# Patient Record
Sex: FEMALE | Race: WHITE | Employment: UNEMPLOYED | URBAN - METROPOLITAN AREA
[De-identification: names, ages, dates, MRNs, and addresses within clinical notes are randomized per-mention and may not be internally consistent; named-entity substitution may affect disease eponyms.]

---

## 2016-08-01 LAB
ANTIBODY SCREEN, EXTERNAL: NORMAL
CHLAMYDIA, EXTERNAL: NEGATIVE
HBSAG, EXTERNAL: NEGATIVE
HIV, EXTERNAL: NEGATIVE
N. GONORRHEA, EXTERNAL: NEGATIVE
RPR, EXTERNAL: NONREACTIVE
RUBELLA, EXTERNAL: NORMAL
TYPE, ABO & RH, EXTERNAL: NORMAL

## 2017-03-01 LAB — GRBS, EXTERNAL: NEGATIVE

## 2017-03-05 ENCOUNTER — HOSPITAL ENCOUNTER (EMERGENCY)
Age: 32
LOS: 1 days | Discharge: HOME | End: 2017-03-05
Attending: OBSTETRICS & GYNECOLOGY | Admitting: OBSTETRICS & GYNECOLOGY
Payer: OTHER GOVERNMENT

## 2017-03-05 VITALS
HEART RATE: 98 BPM | DIASTOLIC BLOOD PRESSURE: 85 MMHG | WEIGHT: 165 LBS | TEMPERATURE: 98.2 F | BODY MASS INDEX: 31.15 KG/M2 | SYSTOLIC BLOOD PRESSURE: 136 MMHG | HEIGHT: 61 IN

## 2017-03-05 LAB
ALBUMIN SERPL BCP-MCNC: 2.5 G/DL (ref 3.4–5)
ALBUMIN/GLOB SERPL: 0.6 {RATIO} (ref 0.8–1.7)
ALP SERPL-CCNC: 154 U/L (ref 45–117)
ALT SERPL-CCNC: 17 U/L (ref 13–56)
ANION GAP BLD CALC-SCNC: 9 MMOL/L (ref 3–18)
APPEARANCE UR: CLEAR
AST SERPL W P-5'-P-CCNC: 17 U/L (ref 15–37)
BILIRUB SERPL-MCNC: 0.2 MG/DL (ref 0.2–1)
BILIRUB UR QL: NEGATIVE
BUN SERPL-MCNC: 4 MG/DL (ref 7–18)
BUN/CREAT SERPL: 8 (ref 12–20)
CALCIUM SERPL-MCNC: 8.6 MG/DL (ref 8.5–10.1)
CHLORIDE SERPL-SCNC: 103 MMOL/L (ref 100–108)
CO2 SERPL-SCNC: 24 MMOL/L (ref 21–32)
COLOR UR: YELLOW
CREAT SERPL-MCNC: 0.51 MG/DL (ref 0.6–1.3)
ERYTHROCYTE [DISTWIDTH] IN BLOOD BY AUTOMATED COUNT: 14 % (ref 11.6–14.5)
GLOBULIN SER CALC-MCNC: 4.1 G/DL (ref 2–4)
GLUCOSE SERPL-MCNC: 77 MG/DL (ref 74–99)
GLUCOSE UR QL STRIP.AUTO: NEGATIVE MG/DL
HCT VFR BLD AUTO: 33.2 % (ref 35–45)
HGB BLD-MCNC: 11.2 G/DL (ref 12–16)
KETONES UR-MCNC: NEGATIVE MG/DL
LDH SERPL L TO P-CCNC: 185 U/L (ref 81–234)
LEUKOCYTE ESTERASE UR QL STRIP: NEGATIVE
MCH RBC QN AUTO: 29.7 PG (ref 24–34)
MCHC RBC AUTO-ENTMCNC: 33.7 G/DL (ref 31–37)
MCV RBC AUTO: 88.1 FL (ref 74–97)
NITRITE UR QL: NEGATIVE
PH UR: 6 [PH] (ref 5–8)
PLATELET # BLD AUTO: 217 K/UL (ref 135–420)
PMV BLD AUTO: 11 FL (ref 9.2–11.8)
POTASSIUM SERPL-SCNC: 3.7 MMOL/L (ref 3.5–5.5)
PROT SERPL-MCNC: 6.6 G/DL (ref 6.4–8.2)
PROT UR QL: NEGATIVE MG/DL
RBC # BLD AUTO: 3.77 M/UL (ref 4.2–5.3)
RBC # UR STRIP: NEGATIVE /UL
SODIUM SERPL-SCNC: 136 MMOL/L (ref 136–145)
SP GR UR: 1.01 (ref 1–1.03)
URATE SERPL-MCNC: 4.7 MG/DL (ref 2.6–7.2)
UROBILINOGEN UR QL: 0.2 EU/DL (ref 0.2–1)
WBC # BLD AUTO: 11.1 K/UL (ref 4.6–13.2)

## 2017-03-05 PROCEDURE — 36415 COLL VENOUS BLD VENIPUNCTURE: CPT | Performed by: ADVANCED PRACTICE MIDWIFE

## 2017-03-05 PROCEDURE — 81003 URINALYSIS AUTO W/O SCOPE: CPT

## 2017-03-05 PROCEDURE — 99284 EMERGENCY DEPT VISIT MOD MDM: CPT

## 2017-03-05 PROCEDURE — 83615 LACTATE (LD) (LDH) ENZYME: CPT | Performed by: ADVANCED PRACTICE MIDWIFE

## 2017-03-05 PROCEDURE — 80053 COMPREHEN METABOLIC PANEL: CPT | Performed by: ADVANCED PRACTICE MIDWIFE

## 2017-03-05 PROCEDURE — 59025 FETAL NON-STRESS TEST: CPT

## 2017-03-05 PROCEDURE — 85027 COMPLETE CBC AUTOMATED: CPT | Performed by: ADVANCED PRACTICE MIDWIFE

## 2017-03-05 PROCEDURE — 84550 ASSAY OF BLOOD/URIC ACID: CPT | Performed by: ADVANCED PRACTICE MIDWIFE

## 2017-03-05 NOTE — DISCHARGE INSTRUCTIONS
Week 37 of Your Pregnancy: Care Instructions  Your Care Instructions    You are near the end of your pregnancy--and you're probably pretty uncomfortable. It may be harder to walk around. Lying down probably isn't comfortable either. You may have trouble getting to sleep or staying asleep. Most women deliver their babies between 40 and 41 weeks. This is a good time to think about packing a bag for the hospital with items you'll need. Then you'll be ready when labor starts. Follow-up care is a key part of your treatment and safety. Be sure to make and go to all appointments, and call your doctor if you are having problems. It's also a good idea to know your test results and keep a list of the medicines you take. How can you care for yourself at home? Learn about breastfeeding  · Breastfeeding is best for your baby and good for you. · Breast milk has antibodies to help your baby fight infections. · Mothers who breastfeed often lose weight faster, because making milk burns calories. · Learning the best ways to hold your baby will make breastfeeding easier. · Let your partner bathe and diaper the baby to keep your partner from feeling left out. Snuggle together when you breastfeed. · You may want to learn how to use a breast pump and store your milk. · If you choose to bottle feed, make the feeding feel like breastfeeding so you can bond with your baby. Always hold your baby and the bottle. Do not prop bottles or let your baby fall asleep with a bottle. Learn about crying  · It is common for babies to cry for 1 to 3 hours a day. Some cry more, some cry less. · Babies don't cry to make you upset or because you are a bad parent. · Crying is how your baby communicates. Your baby may be hungry; have gas; need a diaper change; or feel cold, warm, tired, lonely, or tense. Sometimes babies cry for unknown reasons. · If you respond to your baby's needs, he or she will learn to trust you.   · Try to stay calm when your baby cries. Your baby may get more upset if he or she senses that you are upset. Know how to care for your   · Your baby's umbilical cord stump will drop off on its own, usually between 1 and 2 weeks. To care for your baby's umbilical cord area:  ¨ Clean the area at the bottom of the cord 2 or 3 times a day. ¨ Pay special attention to the area where the cord attaches to the skin. ¨ Keep the diaper folded below the cord. ¨ Use a damp washcloth or cotton ball to sponge bathe your baby until the stump has come off. · Your baby's first dark stool is called meconium. After the meconium is passed, your baby will develop his or her own bowel pattern. ¨ Some babies, especially  babies, have several bowel movements a day. Others have one or two a day, or one every 2 to 3 days. ¨  babies often have loose, yellow stools. Formula-fed babies have more formed stools. ¨ If your baby's stools look like little pellets, he or she is constipated. After 2 days of constipation, call your baby's doctor. · If your baby will be circumcised, you can care for him at home. ¨ Gently rinse his penis with warm water after every diaper change. Do not try to remove the film that forms on the penis. This film will go away on its own. Pat dry. ¨ Put petroleum ointment, such as Vaseline, on the area of the diaper that will touch your baby's penis. This will keep the diaper from sticking to your baby. ¨ Ask the doctor about giving your baby acetaminophen (Tylenol) for pain. Where can you learn more? Go to http://krystal-jenn.info/. Enter 68 21 97 in the search box to learn more about \"Week 37 of Your Pregnancy: Care Instructions. \"  Current as of: May 30, 2016  Content Version: 11.1  © 6877-0945 [employer]. Care instructions adapted under license by [employer] (which disclaims liability or warranty for this information).  If you have questions about a medical condition or this instruction, always ask your healthcare professional. [address] any warranty or liability for your use of this information. Pregnancy Precautions: Care Instructions  Your Care Instructions  There is no sure way to prevent labor before your due date ( labor) or to prevent most other pregnancy problems. But there are things you can do to increase your chances of a healthy pregnancy. Go to your appointments, follow your doctor's advice, and take good care of yourself. Eat well, and exercise (if your doctor agrees). And make sure to drink plenty of water. Follow-up care is a key part of your treatment and safety. Be sure to make and go to all appointments, and call your doctor if you are having problems. It's also a good idea to know your test results and keep a list of the medicines you take. How can you care for yourself at home? · Make sure you go to your prenatal appointments. At each visit, your doctor will check your blood pressure. Your doctor will also check to see if you have protein in your urine. High blood pressure and protein in urine are signs of preeclampsia. This condition can be dangerous for you and your baby. · Drink plenty of fluids, enough so that your urine is light yellow or clear like water. Dehydration can cause contractions. If you have kidney, heart, or liver disease and have to limit fluids, talk with your doctor before you increase the amount of fluids you drink. · Tell your doctor right away if you notice any symptoms of an infection, such as:  ¨ Burning when you urinate. ¨ A foul-smelling discharge from your vagina. ¨ Vaginal itching. ¨ Unexplained fever. ¨ Unusual pain or soreness in your uterus or lower belly. · Eat a balanced diet. Include plenty of foods that are high in calcium and iron. ¨ Foods high in calcium include milk, cheese, yogurt, almonds, and broccoli.   ¨ Foods high in iron include red meat, shellfish, poultry, eggs, beans, raisins, whole-grain bread, and leafy green vegetables. · Do not smoke. If you need help quitting, talk to your doctor about stop-smoking programs and medicines. These can increase your chances of quitting for good. · Do not drink alcohol or use illegal drugs. · Follow your doctor's directions about activity. Your doctor will let you know how much, if any, exercise you can do. · Ask your doctor if you can have sex. If you are at risk for early labor, your doctor may ask you to not have sex. · Take care to prevent falls. During pregnancy, your joints are loose, and your balance is off. Sports such as bicycling, skiing, or in-line skating can increase your risk of falling. And don't ride horses or motorcycles, dive, water ski, scuba dive, or parachute jump while you are pregnant. · Avoid getting very hot. Do not use saunas or hot tubs. Avoid staying out in the sun in hot weather for long periods. Take acetaminophen (Tylenol) to lower a high fever. · Do not take any over-the-counter or herbal medicines or supplements without talking to your doctor or pharmacist first.  When should you call for help? Call 911 anytime you think you may need emergency care. For example, call if:  · You passed out (lost consciousness). · You have severe vaginal bleeding. · You have severe pain in your belly or pelvis. · You have had fluid gushing or leaking from your vagina and you know or think the umbilical cord is bulging into your vagina. If this happens, immediately get down on your knees so your rear end (buttocks) is higher than your head. This will decrease the pressure on the cord until help arrives. Call your doctor now or seek immediate medical care if:  · You have signs of preeclampsia, such as:  ¨ Sudden swelling of your face, hands, or feet. ¨ New vision problems (such as dimness or blurring). ¨ A severe headache. · You have any vaginal bleeding. · You have belly pain or cramping.   · You have a fever. · You have had regular contractions (with or without pain) for an hour. This means that you have 8 or more within 1 hour or 4 or more in 20 minutes after you change your position and drink fluids. · You have a sudden release of fluid from your vagina. · You have low back pain or pelvic pressure that does not go away. · You notice that your baby has stopped moving or is moving much less than normal.  Watch closely for changes in your health, and be sure to contact your doctor if you have any problems. Where can you learn more? Go to http://krystal-jenn.info/. Enter [id number] in the search box to learn more about \"Pregnancy Precautions: Care Instructions. \"  Current as of: May 30, 2016  Content Version: 11.1  © 3684-1753 [employer]. Care instructions adapted under license by [employer] (which disclaims liability or warranty for this information). If you have questions about a medical condition or this instruction, always ask your healthcare professional. [address] any warranty or liability for your use of this information.

## 2017-03-05 NOTE — IP AVS SNAPSHOT
Dwaine Guerra 
 
 
 [address] [facility] [address] 
[phone] Patient: Rachel Feliciano MRN: [id number] :1985 You are allergic to the following No active allergies Recent Documentation Height Weight BMI OB Status Smoking Status 1.549 m 74.8 kg 31.18 kg/m2 Pregnant Never Smoker Emergency Contacts Name Discharge Info Relation Home Work Mobile Last [place] DISCHARGE CAREGIVER [3] Spouse [3] Cell only  [phone] Kip Moses  Mother [14] [phone] About your hospitalization You were admitted on:  2017 You last received care in the:  [place] [address] [facility] You were discharged on:  2017 Unit phone number:  [phone] Why you were hospitalized Your primary diagnosis was:  Not on File Providers Seen During Your Hospitalizations Provider Role Specialty Primary office phone Karla Baca MD Attending Provider Obstetrics & Gynecology [phone] Your Primary Care Physician (PCP) Primary Care Physician Office Phone Office Fax OTHER, PHYS ** None ** ** None ** Follow-up Information None Current Discharge Medication List  
  
Notice You have not been prescribed any medications. Discharge Instructions Week 37 of Your Pregnancy: Care Instructions Your Care Instructions You are near the end of your pregnancyand you're probably pretty uncomfortable. It may be harder to walk around. Lying down probably isn't comfortable either. You may have trouble getting to sleep or staying asleep. Most women deliver their babies between 40 and 41 weeks. This is a good time to think about packing a bag for the hospital with items you'll need. Then you'll be ready when labor starts. Follow-up care is a key part of your treatment and safety.  Be sure to make and go to all appointments, and call your doctor if you are having problems. It's also a good idea to know your test results and keep a list of the medicines you take. How can you care for yourself at home? Learn about breastfeeding · Breastfeeding is best for your baby and good for you. · Breast milk has antibodies to help your baby fight infections. · Mothers who breastfeed often lose weight faster, because making milk burns calories. · Learning the best ways to hold your baby will make breastfeeding easier. · Let your partner bathe and diaper the baby to keep your partner from feeling left out. Snuggle together when you breastfeed. · You may want to learn how to use a breast pump and store your milk. · If you choose to bottle feed, make the feeding feel like breastfeeding so you can bond with your baby. Always hold your baby and the bottle. Do not prop bottles or let your baby fall asleep with a bottle. Learn about crying · It is common for babies to cry for 1 to 3 hours a day. Some cry more, some cry less. · Babies don't cry to make you upset or because you are a bad parent. · Crying is how your baby communicates. Your baby may be hungry; have gas; need a diaper change; or feel cold, warm, tired, lonely, or tense. Sometimes babies cry for unknown reasons. · If you respond to your baby's needs, he or she will learn to trust you. · Try to stay calm when your baby cries. Your baby may get more upset if he or she senses that you are upset. Know how to care for your  · Your baby's umbilical cord stump will drop off on its own, usually between 1 and 2 weeks. To care for your baby's umbilical cord area: ¨ Clean the area at the bottom of the cord 2 or 3 times a day. ¨ Pay special attention to the area where the cord attaches to the skin. ¨ Keep the diaper folded below the cord. ¨ Use a damp washcloth or cotton ball to sponge bathe your baby until the stump has come off. · Your baby's first dark stool is called meconium. After the meconium is passed, your baby will develop his or her own bowel pattern. ¨ Some babies, especially  babies, have several bowel movements a day. Others have one or two a day, or one every 2 to 3 days. ¨  babies often have loose, yellow stools. Formula-fed babies have more formed stools. ¨ If your baby's stools look like little pellets, he or she is constipated. After 2 days of constipation, call your baby's doctor. · If your baby will be circumcised, you can care for him at home. ¨ Gently rinse his penis with warm water after every diaper change. Do not try to remove the film that forms on the penis. This film will go away on its own. Pat dry. ¨ Put petroleum ointment, such as Vaseline, on the area of the diaper that will touch your baby's penis. This will keep the diaper from sticking to your baby. ¨ Ask the doctor about giving your baby acetaminophen (Tylenol) for pain. Where can you learn more? Go to http://krystalzhiwojenn.info/. Enter 68 21 97 in the search box to learn more about \"Week 37 of Your Pregnancy: Care Instructions. \" Current as of: May 30, 2016 Content Version: 11.1 © 5406-9271 [employer]. Care instructions adapted under license by [employer] (which disclaims liability or warranty for this information). If you have questions about a medical condition or this instruction, always ask your healthcare professional. [place] [address] any warranty or liability for your use of this information. Pregnancy Precautions: Care Instructions Your Care Instructions There is no sure way to prevent labor before your due date ( labor) or to prevent most other pregnancy problems. But there are things you can do to increase your chances of a healthy pregnancy. Go to your appointments, follow your doctor's advice, and take good care of yourself. Eat well, and exercise (if your doctor agrees). And make sure to drink plenty of water. Follow-up care is a key part of your treatment and safety. Be sure to make and go to all appointments, and call your doctor if you are having problems. It's also a good idea to know your test results and keep a list of the medicines you take. How can you care for yourself at home? · Make sure you go to your prenatal appointments. At each visit, your doctor will check your blood pressure. Your doctor will also check to see if you have protein in your urine. High blood pressure and protein in urine are signs of preeclampsia. This condition can be dangerous for you and your baby. · Drink plenty of fluids, enough so that your urine is light yellow or clear like water. Dehydration can cause contractions. If you have kidney, heart, or liver disease and have to limit fluids, talk with your doctor before you increase the amount of fluids you drink. · Tell your doctor right away if you notice any symptoms of an infection, such as: ¨ Burning when you urinate. ¨ A foul-smelling discharge from your vagina. ¨ Vaginal itching. ¨ Unexplained fever. ¨ Unusual pain or soreness in your uterus or lower belly. · Eat a balanced diet. Include plenty of foods that are high in calcium and iron. ¨ Foods high in calcium include milk, cheese, yogurt, almonds, and broccoli. ¨ Foods high in iron include red meat, shellfish, poultry, eggs, beans, raisins, whole-grain bread, and leafy green vegetables. · Do not smoke. If you need help quitting, talk to your doctor about stop-smoking programs and medicines. These can increase your chances of quitting for good. · Do not drink alcohol or use illegal drugs. · Follow your doctor's directions about activity. Your doctor will let you know how much, if any, exercise you can do. · Ask your doctor if you can have sex. If you are at risk for early labor, your doctor may ask you to not have sex. · Take care to prevent falls. During pregnancy, your joints are loose, and your balance is off. Sports such as bicycling, skiing, or in-line skating can increase your risk of falling. And don't ride horses or motorcycles, dive, water ski, scuba dive, or parachute jump while you are pregnant. · Avoid getting very hot. Do not use saunas or hot tubs. Avoid staying out in the sun in hot weather for long periods. Take acetaminophen (Tylenol) to lower a high fever. · Do not take any over-the-counter or herbal medicines or supplements without talking to your doctor or pharmacist first. 
When should you call for help? Call 911 anytime you think you may need emergency care. For example, call if: 
· You passed out (lost consciousness). · You have severe vaginal bleeding. · You have severe pain in your belly or pelvis. · You have had fluid gushing or leaking from your vagina and you know or think the umbilical cord is bulging into your vagina. If this happens, immediately get down on your knees so your rear end (buttocks) is higher than your head. This will decrease the pressure on the cord until help arrives. Call your doctor now or seek immediate medical care if: 
· You have signs of preeclampsia, such as: 
¨ Sudden swelling of your face, hands, or feet. ¨ New vision problems (such as dimness or blurring). ¨ A severe headache. · You have any vaginal bleeding. · You have belly pain or cramping. · You have a fever. · You have had regular contractions (with or without pain) for an hour. This means that you have 8 or more within 1 hour or 4 or more in 20 minutes after you change your position and drink fluids. · You have a sudden release of fluid from your vagina. · You have low back pain or pelvic pressure that does not go away.  
· You notice that your baby has stopped moving or is moving much less than normal. 
Watch closely for changes in your health, and be sure to contact your doctor if you have any problems. Where can you learn more? Go to http://krystal-jenn.info/. Enter [id number] in the search box to learn more about \"Pregnancy Precautions: Care Instructions. \" Current as of: May 30, 2016 Content Version: 11.1 © 9237-6211 [email or web address]. Care instructions adapted under license by [employer] (which disclaims liability or warranty for this information). If you have questions about a medical condition or this instruction, always ask your healthcare professional. [address] any warranty or liability for your use of this information. Discharge Orders None Introducing \[facility]\"" & HEALTH SERVICES! Gurjit Wooten introduces Nexamp patient portal. Now you can access parts of your medical record, email your doctor's office, and request medication refills online. 1. In your internet browser, go to [email or web address] 2. Click on the First Time User? Click Here link in the Sign In box. You will see the New Member Sign Up page. 3. Enter your Nexamp Access Code exactly as it appears below. You will not need to use this code after youve completed the sign-up process. If you do not sign up before the expiration date, you must request a new code. · Nexamp Access Code: [email or web address] Expires: 5/26/2017 12:21 AM 
 
4. Enter the last four digits of your Social Security Number (xxxx) and Date of Birth (mm/dd/yyyy) as indicated and click Submit. You will be taken to the next sign-up page. 5. Create a Nexamp ID. This will be your Nexamp login ID and cannot be changed, so think of one that is secure and easy to remember. 6. Create a Nexamp password. You can change your password at any time. 7. Enter your Password Reset Question and Answer. This can be used at a later time if you forget your password. 8. Enter your e-mail address.  You will receive e-mail notification when new information is available in [employer]it. 9. Click Sign Up. You can now view and download portions of your medical record. 10. Click the Download Summary menu link to download a portable copy of your medical information. If you have questions, please visit the Frequently Asked Questions section of the "[employer] website. Remember, "[employer] is NOT to be used for urgent needs. For medical emergencies, dial 911. Now available from your iPhone and Android! General Information Please provide this summary of care documentation to your next provider. Patient Signature:  ____________________________________________________________ Date:  ____________________________________________________________  
  
[facility] Provider Signature:  ____________________________________________________________ Date:  ____________________________________________________________

## 2017-03-05 NOTE — IP AVS SNAPSHOT
Summary of Care Report The Summary of Care report has been created to help improve care coordination. Users with access to [employer] or [employer] [address] (Web-based application) may access additional patient information including the Discharge Summary. If you are not currently a [address] user and need more information, please call the number listed below in the [place] [address] section and ask to be connected with Medical Records. Facility Information Name Address Phone [address] [facility] [address] [phone] Patient Information Patient Name Sex PITA White ([id number]) Female 1985 Discharge Information Admitting Provider Service Area Unit Jonel Pierce MD / [phone] [address] [facility] [address] Triage / [phone] Discharge Provider Discharge Date/Time Discharge Disposition Destination (none) (none) (none) (none) Patient Language Language ENGLISH [13] You are allergic to the following No active allergies Current Discharge Medication List  
  
Notice You have not been prescribed any medications. Follow-up Information None Discharge Instructions Week 37 of Your Pregnancy: Care Instructions Your Care Instructions You are near the end of your pregnancyand you're probably pretty uncomfortable. It may be harder to walk around. Lying down probably isn't comfortable either. You may have trouble getting to sleep or staying asleep. Most women deliver their babies between 40 and 41 weeks. This is a good time to think about packing a bag for the hospital with items you'll need. Then you'll be ready when labor starts. Follow-up care is a key part of your treatment and safety.  Be sure to make and go to all appointments, and call your doctor if you are having problems. It's also a good idea to know your test results and keep a list of the medicines you take. How can you care for yourself at home? Learn about breastfeeding · Breastfeeding is best for your baby and good for you. · Breast milk has antibodies to help your baby fight infections. · Mothers who breastfeed often lose weight faster, because making milk burns calories. · Learning the best ways to hold your baby will make breastfeeding easier. · Let your partner bathe and diaper the baby to keep your partner from feeling left out. Snuggle together when you breastfeed. · You may want to learn how to use a breast pump and store your milk. · If you choose to bottle feed, make the feeding feel like breastfeeding so you can bond with your baby. Always hold your baby and the bottle. Do not prop bottles or let your baby fall asleep with a bottle. Learn about crying · It is common for babies to cry for 1 to 3 hours a day. Some cry more, some cry less. · Babies don't cry to make you upset or because you are a bad parent. · Crying is how your baby communicates. Your baby may be hungry; have gas; need a diaper change; or feel cold, warm, tired, lonely, or tense. Sometimes babies cry for unknown reasons. · If you respond to your baby's needs, he or she will learn to trust you. · Try to stay calm when your baby cries. Your baby may get more upset if he or she senses that you are upset. Know how to care for your  · Your baby's umbilical cord stump will drop off on its own, usually between 1 and 2 weeks. To care for your baby's umbilical cord area: ¨ Clean the area at the bottom of the cord 2 or 3 times a day. ¨ Pay special attention to the area where the cord attaches to the skin. ¨ Keep the diaper folded below the cord. ¨ Use a damp washcloth or cotton ball to sponge bathe your baby until the stump has come off. · Your baby's first dark stool is called meconium.  After the meconium is passed, your baby will develop his or her own bowel pattern. ¨ Some babies, especially  babies, have several bowel movements a day. Others have one or two a day, or one every 2 to 3 days. ¨  babies often have loose, yellow stools. Formula-fed babies have more formed stools. ¨ If your baby's stools look like little pellets, he or she is constipated. After 2 days of constipation, call your baby's doctor. · If your baby will be circumcised, you can care for him at home. ¨ Gently rinse his penis with warm water after every diaper change. Do not try to remove the film that forms on the penis. This film will go away on its own. Pat dry. ¨ Put petroleum ointment, such as Vaseline, on the area of the diaper that will touch your baby's penis. This will keep the diaper from sticking to your baby. ¨ Ask the doctor about giving your baby acetaminophen (Tylenol) for pain. Where can you learn more? Go to http://krystal-jenn.info/. Enter 68 21 46 in the search box to learn more about \"Week 37 of Your Pregnancy: Care Instructions. \" Current as of: May 30, 2016 Content Version: 11.1 © 5844-8905 [employer]. Care instructions adapted under license by [employer] (which disclaims liability or warranty for this information). If you have questions about a medical condition or this instruction, always ask your healthcare professional. [place] [address] any warranty or liability for your use of this information. Pregnancy Precautions: Care Instructions Your Care Instructions There is no sure way to prevent labor before your due date ( labor) or to prevent most other pregnancy problems. But there are things you can do to increase your chances of a healthy pregnancy. Go to your appointments, follow your doctor's advice, and take good care of yourself. Eat well, and exercise (if your doctor agrees). And make sure to drink plenty of water. Follow-up care is a key part of your treatment and safety. Be sure to make and go to all appointments, and call your doctor if you are having problems. It's also a good idea to know your test results and keep a list of the medicines you take. How can you care for yourself at home? · Make sure you go to your prenatal appointments. At each visit, your doctor will check your blood pressure. Your doctor will also check to see if you have protein in your urine. High blood pressure and protein in urine are signs of preeclampsia. This condition can be dangerous for you and your baby. · Drink plenty of fluids, enough so that your urine is light yellow or clear like water. Dehydration can cause contractions. If you have kidney, heart, or liver disease and have to limit fluids, talk with your doctor before you increase the amount of fluids you drink. · Tell your doctor right away if you notice any symptoms of an infection, such as: ¨ Burning when you urinate. ¨ A foul-smelling discharge from your vagina. ¨ Vaginal itching. ¨ Unexplained fever. ¨ Unusual pain or soreness in your uterus or lower belly. · Eat a balanced diet. Include plenty of foods that are high in calcium and iron. ¨ Foods high in calcium include milk, cheese, yogurt, almonds, and broccoli. ¨ Foods high in iron include red meat, shellfish, poultry, eggs, beans, raisins, whole-grain bread, and leafy green vegetables. · Do not smoke. If you need help quitting, talk to your doctor about stop-smoking programs and medicines. These can increase your chances of quitting for good. · Do not drink alcohol or use illegal drugs. · Follow your doctor's directions about activity. Your doctor will let you know how much, if any, exercise you can do. · Ask your doctor if you can have sex. If you are at risk for early labor, your doctor may ask you to not have sex. · Take care to prevent falls.  During pregnancy, your joints are loose, and your balance is off. Sports such as bicycling, skiing, or in-line skating can increase your risk of falling. And don't ride horses or motorcycles, dive, water ski, scuba dive, or parachute jump while you are pregnant. · Avoid getting very hot. Do not use saunas or hot tubs. Avoid staying out in the sun in hot weather for long periods. Take acetaminophen (Tylenol) to lower a high fever. · Do not take any over-the-counter or herbal medicines or supplements without talking to your doctor or pharmacist first. 
When should you call for help? Call 911 anytime you think you may need emergency care. For example, call if: 
· You passed out (lost consciousness). · You have severe vaginal bleeding. · You have severe pain in your belly or pelvis. · You have had fluid gushing or leaking from your vagina and you know or think the umbilical cord is bulging into your vagina. If this happens, immediately get down on your knees so your rear end (buttocks) is higher than your head. This will decrease the pressure on the cord until help arrives. Call your doctor now or seek immediate medical care if: 
· You have signs of preeclampsia, such as: 
¨ Sudden swelling of your face, hands, or feet. ¨ New vision problems (such as dimness or blurring). ¨ A severe headache. · You have any vaginal bleeding. · You have belly pain or cramping. · You have a fever. · You have had regular contractions (with or without pain) for an hour. This means that you have 8 or more within 1 hour or 4 or more in 20 minutes after you change your position and drink fluids. · You have a sudden release of fluid from your vagina. · You have low back pain or pelvic pressure that does not go away. · You notice that your baby has stopped moving or is moving much less than normal. 
Watch closely for changes in your health, and be sure to contact your doctor if you have any problems. Where can you learn more? Go to http://krystal-jenn.info/. Enter [id number] in the search box to learn more about \"Pregnancy Precautions: Care Instructions. \" Current as of: May 30, 2016 Content Version: 11.1 © 3182-1995 [employer]. Care instructions adapted under license by [employer] (which disclaims liability or warranty for this information). If you have questions about a medical condition or this instruction, always ask your healthcare professional. [place] [address] any warranty or liability for your use of this information. Chart Review Routing History No Routing History on File

## 2017-03-19 ENCOUNTER — HOSPITAL ENCOUNTER (INPATIENT)
Age: 32
LOS: 2 days | Discharge: HOME | End: 2017-03-21
Attending: OBSTETRICS & GYNECOLOGY | Admitting: OBSTETRICS & GYNECOLOGY
Payer: OTHER GOVERNMENT

## 2017-03-19 ENCOUNTER — ANESTHESIA EVENT (OUTPATIENT)
Dept: LABOR AND DELIVERY | Age: 32
End: 2017-03-19
Payer: OTHER GOVERNMENT

## 2017-03-19 ENCOUNTER — ANESTHESIA (OUTPATIENT)
Dept: LABOR AND DELIVERY | Age: 32
End: 2017-03-19
Payer: OTHER GOVERNMENT

## 2017-03-19 PROBLEM — Z34.80 SUPERVISION OF NORMAL IUP (INTRAUTERINE PREGNANCY) IN MULTIGRAVIDA: Status: ACTIVE | Noted: 2017-03-19

## 2017-03-19 LAB
ABO + RH BLD: NORMAL
BASOPHILS # BLD AUTO: 0 K/UL (ref 0–0.06)
BASOPHILS # BLD: 0 % (ref 0–2)
BLOOD GROUP ANTIBODIES SERPL: NORMAL
DIFFERENTIAL METHOD BLD: ABNORMAL
EOSINOPHIL # BLD: 0.1 K/UL (ref 0–0.4)
EOSINOPHIL NFR BLD: 1 % (ref 0–5)
ERYTHROCYTE [DISTWIDTH] IN BLOOD BY AUTOMATED COUNT: 14.4 % (ref 11.6–14.5)
HCT VFR BLD AUTO: 34.5 % (ref 35–45)
HGB BLD-MCNC: 11.3 G/DL (ref 12–16)
LYMPHOCYTES # BLD AUTO: 13 % (ref 21–52)
LYMPHOCYTES # BLD: 1.6 K/UL (ref 0.9–3.6)
MCH RBC QN AUTO: 29 PG (ref 24–34)
MCHC RBC AUTO-ENTMCNC: 32.8 G/DL (ref 31–37)
MCV RBC AUTO: 88.5 FL (ref 74–97)
MONOCYTES # BLD: 0.8 K/UL (ref 0.05–1.2)
MONOCYTES NFR BLD AUTO: 6 % (ref 3–10)
NEUTS SEG # BLD: 10.2 K/UL (ref 1.8–8)
NEUTS SEG NFR BLD AUTO: 80 % (ref 40–73)
PLATELET # BLD AUTO: 212 K/UL (ref 135–420)
PMV BLD AUTO: 11.7 FL (ref 9.2–11.8)
RBC # BLD AUTO: 3.9 M/UL (ref 4.2–5.3)
SPECIMEN EXP DATE BLD: NORMAL
WBC # BLD AUTO: 12.7 K/UL (ref 4.6–13.2)

## 2017-03-19 PROCEDURE — 77030007879 HC KT SPN EPDRL TELE -B: Performed by: ANESTHESIOLOGY

## 2017-03-19 PROCEDURE — 74011250636 HC RX REV CODE- 250/636

## 2017-03-19 PROCEDURE — 74011250637 HC RX REV CODE- 250/637: Performed by: ADVANCED PRACTICE MIDWIFE

## 2017-03-19 PROCEDURE — 75410000000 HC DELIVERY VAGINAL/SINGLE

## 2017-03-19 PROCEDURE — 74011250636 HC RX REV CODE- 250/636: Performed by: ADVANCED PRACTICE MIDWIFE

## 2017-03-19 PROCEDURE — 99282 EMERGENCY DEPT VISIT SF MDM: CPT

## 2017-03-19 PROCEDURE — 3E0S3CZ INTRODUCE REGIONAL ANESTH IN EPIDURAL SPACE, PERC: ICD-10-PCS | Performed by: ANESTHESIOLOGY

## 2017-03-19 PROCEDURE — 85025 COMPLETE CBC W/AUTO DIFF WBC: CPT

## 2017-03-19 PROCEDURE — 75410000002 HC LABOR FEE PER 1 HR

## 2017-03-19 PROCEDURE — 65270000029 HC RM PRIVATE

## 2017-03-19 PROCEDURE — 86900 BLOOD TYPING SEROLOGIC ABO: CPT

## 2017-03-19 PROCEDURE — 77030018749 HC HK AMNIO DISP DERY -A

## 2017-03-19 PROCEDURE — 76060000078 HC EPIDURAL ANESTHESIA

## 2017-03-19 PROCEDURE — 59025 FETAL NON-STRESS TEST: CPT

## 2017-03-19 PROCEDURE — 74011250636 HC RX REV CODE- 250/636: Performed by: ANESTHESIOLOGY

## 2017-03-19 PROCEDURE — 75410000003 HC RECOV DEL/VAG/CSECN EA 0.5 HR

## 2017-03-19 PROCEDURE — 36415 COLL VENOUS BLD VENIPUNCTURE: CPT

## 2017-03-19 PROCEDURE — 74011000250 HC RX REV CODE- 250

## 2017-03-19 RX ORDER — FENTANYL CITRATE 50 UG/ML
100 INJECTION, SOLUTION INTRAMUSCULAR; INTRAVENOUS ONCE
Status: DISCONTINUED | OUTPATIENT
Start: 2017-03-19 | End: 2017-03-19 | Stop reason: HOSPADM

## 2017-03-19 RX ORDER — NALOXONE HYDROCHLORIDE 0.4 MG/ML
0.2 INJECTION, SOLUTION INTRAMUSCULAR; INTRAVENOUS; SUBCUTANEOUS AS NEEDED
Status: DISCONTINUED | OUTPATIENT
Start: 2017-03-19 | End: 2017-03-19 | Stop reason: HOSPADM

## 2017-03-19 RX ORDER — ONDANSETRON 2 MG/ML
4 INJECTION INTRAMUSCULAR; INTRAVENOUS
Status: DISCONTINUED | OUTPATIENT
Start: 2017-03-19 | End: 2017-03-19 | Stop reason: HOSPADM

## 2017-03-19 RX ORDER — FENTANYL CITRATE 50 UG/ML
INJECTION, SOLUTION INTRAMUSCULAR; INTRAVENOUS AS NEEDED
Status: DISCONTINUED | OUTPATIENT
Start: 2017-03-19 | End: 2017-03-19 | Stop reason: HOSPADM

## 2017-03-19 RX ORDER — METHYLERGONOVINE MALEATE 0.2 MG/ML
0.2 INJECTION INTRAVENOUS AS NEEDED
Status: DISCONTINUED | OUTPATIENT
Start: 2017-03-19 | End: 2017-03-19 | Stop reason: HOSPADM

## 2017-03-19 RX ORDER — FENTANYL/ROPIVACAINE/NS/PF 2MCG/ML-.1
1-15 PLASTIC BAG, INJECTION (ML) EPIDURAL
Status: DISCONTINUED | OUTPATIENT
Start: 2017-03-19 | End: 2017-03-19 | Stop reason: HOSPADM

## 2017-03-19 RX ORDER — AMOXICILLIN 250 MG
1 CAPSULE ORAL
Status: DISCONTINUED | OUTPATIENT
Start: 2017-03-19 | End: 2017-03-21 | Stop reason: HOSPADM

## 2017-03-19 RX ORDER — OXYTOCIN/RINGER'S LACTATE 20/1000 ML
125 PLASTIC BAG, INJECTION (ML) INTRAVENOUS CONTINUOUS
Status: DISCONTINUED | OUTPATIENT
Start: 2017-03-19 | End: 2017-03-19 | Stop reason: HOSPADM

## 2017-03-19 RX ORDER — IBUPROFEN 400 MG/1
800 TABLET ORAL
Status: DISCONTINUED | OUTPATIENT
Start: 2017-03-19 | End: 2017-03-21 | Stop reason: HOSPADM

## 2017-03-19 RX ORDER — SODIUM CHLORIDE 0.9 % (FLUSH) 0.9 %
5-10 SYRINGE (ML) INJECTION AS NEEDED
Status: DISCONTINUED | OUTPATIENT
Start: 2017-03-19 | End: 2017-03-19 | Stop reason: HOSPADM

## 2017-03-19 RX ORDER — OXYTOCIN/RINGER'S LACTATE 20/1000 ML
500 PLASTIC BAG, INJECTION (ML) INTRAVENOUS ONCE
Status: COMPLETED | OUTPATIENT
Start: 2017-03-19 | End: 2017-03-19

## 2017-03-19 RX ORDER — DIPHENHYDRAMINE HYDROCHLORIDE 50 MG/ML
25 INJECTION, SOLUTION INTRAMUSCULAR; INTRAVENOUS
Status: DISCONTINUED | OUTPATIENT
Start: 2017-03-19 | End: 2017-03-19 | Stop reason: HOSPADM

## 2017-03-19 RX ORDER — ZOLPIDEM TARTRATE 5 MG/1
5 TABLET ORAL
Status: DISCONTINUED | OUTPATIENT
Start: 2017-03-19 | End: 2017-03-21 | Stop reason: HOSPADM

## 2017-03-19 RX ORDER — HYDROMORPHONE HYDROCHLORIDE 1 MG/ML
1 INJECTION, SOLUTION INTRAMUSCULAR; INTRAVENOUS; SUBCUTANEOUS
Status: DISCONTINUED | OUTPATIENT
Start: 2017-03-19 | End: 2017-03-19 | Stop reason: HOSPADM

## 2017-03-19 RX ORDER — ACETAMINOPHEN 325 MG/1
650 TABLET ORAL
Status: DISCONTINUED | OUTPATIENT
Start: 2017-03-19 | End: 2017-03-21 | Stop reason: HOSPADM

## 2017-03-19 RX ORDER — ROPIVACAINE HYDROCHLORIDE 2 MG/ML
INJECTION, SOLUTION EPIDURAL; INFILTRATION; PERINEURAL AS NEEDED
Status: DISCONTINUED | OUTPATIENT
Start: 2017-03-19 | End: 2017-03-19 | Stop reason: HOSPADM

## 2017-03-19 RX ORDER — NALBUPHINE HYDROCHLORIDE 10 MG/ML
10 INJECTION, SOLUTION INTRAMUSCULAR; INTRAVENOUS; SUBCUTANEOUS
Status: DISCONTINUED | OUTPATIENT
Start: 2017-03-19 | End: 2017-03-19 | Stop reason: HOSPADM

## 2017-03-19 RX ORDER — MINERAL OIL
30 OIL (ML) ORAL AS NEEDED
Status: COMPLETED | OUTPATIENT
Start: 2017-03-19 | End: 2017-03-19

## 2017-03-19 RX ORDER — PROMETHAZINE HYDROCHLORIDE 25 MG/ML
25 INJECTION, SOLUTION INTRAMUSCULAR; INTRAVENOUS
Status: DISCONTINUED | OUTPATIENT
Start: 2017-03-19 | End: 2017-03-21 | Stop reason: HOSPADM

## 2017-03-19 RX ORDER — BUTORPHANOL TARTRATE 2 MG/ML
2 INJECTION INTRAMUSCULAR; INTRAVENOUS
Status: DISCONTINUED | OUTPATIENT
Start: 2017-03-19 | End: 2017-03-19 | Stop reason: HOSPADM

## 2017-03-19 RX ORDER — LIDOCAINE HYDROCHLORIDE AND EPINEPHRINE 15; 5 MG/ML; UG/ML
INJECTION, SOLUTION EPIDURAL AS NEEDED
Status: DISCONTINUED | OUTPATIENT
Start: 2017-03-19 | End: 2017-03-19 | Stop reason: HOSPADM

## 2017-03-19 RX ORDER — TERBUTALINE SULFATE 1 MG/ML
0.25 INJECTION SUBCUTANEOUS
Status: DISCONTINUED | OUTPATIENT
Start: 2017-03-19 | End: 2017-03-19 | Stop reason: HOSPADM

## 2017-03-19 RX ORDER — SODIUM CHLORIDE 0.9 % (FLUSH) 0.9 %
5-10 SYRINGE (ML) INJECTION EVERY 8 HOURS
Status: DISCONTINUED | OUTPATIENT
Start: 2017-03-19 | End: 2017-03-19 | Stop reason: HOSPADM

## 2017-03-19 RX ORDER — SODIUM CHLORIDE, SODIUM LACTATE, POTASSIUM CHLORIDE, CALCIUM CHLORIDE 600; 310; 30; 20 MG/100ML; MG/100ML; MG/100ML; MG/100ML
125 INJECTION, SOLUTION INTRAVENOUS CONTINUOUS
Status: DISCONTINUED | OUTPATIENT
Start: 2017-03-19 | End: 2017-03-19 | Stop reason: HOSPADM

## 2017-03-19 RX ORDER — OXYCODONE AND ACETAMINOPHEN 5; 325 MG/1; MG/1
2 TABLET ORAL
Status: DISCONTINUED | OUTPATIENT
Start: 2017-03-19 | End: 2017-03-21 | Stop reason: HOSPADM

## 2017-03-19 RX ORDER — LIDOCAINE HYDROCHLORIDE 10 MG/ML
20 INJECTION, SOLUTION EPIDURAL; INFILTRATION; INTRACAUDAL; PERINEURAL AS NEEDED
Status: DISCONTINUED | OUTPATIENT
Start: 2017-03-19 | End: 2017-03-19 | Stop reason: HOSPADM

## 2017-03-19 RX ADMIN — SODIUM CHLORIDE, SODIUM LACTATE, POTASSIUM CHLORIDE, AND CALCIUM CHLORIDE 125 ML/HR: 600; 310; 30; 20 INJECTION, SOLUTION INTRAVENOUS at 17:15

## 2017-03-19 RX ADMIN — IBUPROFEN 800 MG: 400 TABLET, FILM COATED ORAL at 20:47

## 2017-03-19 RX ADMIN — LIDOCAINE HYDROCHLORIDE AND EPINEPHRINE 3 ML: 15; 5 INJECTION, SOLUTION EPIDURAL at 18:21

## 2017-03-19 RX ADMIN — Medication 30 ML: at 18:55

## 2017-03-19 RX ADMIN — ROPIVACAINE HYDROCHLORIDE 3 ML: 2 INJECTION, SOLUTION EPIDURAL; INFILTRATION; PERINEURAL at 18:25

## 2017-03-19 RX ADMIN — Medication 10000 MILLI-UNITS/HR: at 19:02

## 2017-03-19 RX ADMIN — Medication 10 ML/HR: at 18:26

## 2017-03-19 RX ADMIN — ROPIVACAINE HYDROCHLORIDE 3 ML: 2 INJECTION, SOLUTION EPIDURAL; INFILTRATION; PERINEURAL at 18:24

## 2017-03-19 RX ADMIN — SODIUM CHLORIDE, SODIUM LACTATE, POTASSIUM CHLORIDE, AND CALCIUM CHLORIDE 125 ML/HR: 600; 310; 30; 20 INJECTION, SOLUTION INTRAVENOUS at 17:41

## 2017-03-19 RX ADMIN — ROPIVACAINE HYDROCHLORIDE 4 ML: 2 INJECTION, SOLUTION EPIDURAL; INFILTRATION; PERINEURAL at 18:26

## 2017-03-19 RX ADMIN — FENTANYL CITRATE 100 MCG: 50 INJECTION, SOLUTION INTRAMUSCULAR; INTRAVENOUS at 18:26

## 2017-03-19 RX ADMIN — BUTORPHANOL TARTRATE 2 MG: 2 INJECTION, SOLUTION INTRAMUSCULAR; INTRAVENOUS at 17:32

## 2017-03-19 RX ADMIN — ROPIVACAINE HYDROCHLORIDE 4 ML: 2 INJECTION, SOLUTION EPIDURAL; INFILTRATION; PERINEURAL at 18:44

## 2017-03-19 NOTE — IP AVS SNAPSHOT
Summary of Care Report The Summary of Care report has been created to help improve care coordination. Users with access to [employer] or [facility] (Web-based application) may access additional patient information including the Discharge Summary. If you are not currently a [address] user and need more information, please call the number listed below in the [place] section and ask to be connected with Medical Records. Facility Information Name Address Phone [address] [facility] [address] [phone] Patient Information Patient Name Sex  Timbo Hardin ([id number]) Female 1985 Discharge Information Admitting Provider Service Area Unit Marifer José MD / [phone] [address] / [phone] Discharge Provider Discharge Date/Time Discharge Disposition Destination (none) 3/21/2017 Morning (Pending) AHR (none) Patient Language Language ENGLISH [13] Hospital Problems as of 3/21/2017  Reviewed: 3/19/2017  5:31 PM by Zay Garcia MD  
  
  
  
 Class Noted - Resolved Last Modified POA Active Problems Supervision of normal IUP (intrauterine pregnancy) in multigravida  3/19/2017 - Present 3/19/2017 by Mckinley Dickson CNM Unknown Entered by Mckinley Dickson CNM Normal labor and delivery  3/20/2017 - Present 3/20/2017 by Elon Buerger, CNM Unknown Entered by Elon Buerger, CNM Postpartum care and examination  3/20/2017 - Present 3/20/2017 by Elon Buerger, CNM Unknown Entered by Elon Buerger, CNM Non-Hospital Problems as of 3/21/2017  Reviewed: 3/19/2017  5:31 PM by Zay Garcia MD  
 None You are allergic to the following No active allergies Current Discharge Medication List  
  
START taking these medications Dose & Instructions Dispensing Information Comments  
 ibuprofen 800 mg tablet Commonly known as:  MOTRIN Dose:  800 mg Take 1 Tab by mouth every eight (8) hours as needed. Quantity:  50 Tab Refills:  0 Surgery Information ID Date/Time Status Primary Surgeon All Procedures Location [id number] 3/19/2017 Posted   [facility] - DO NOT SCHEDULE Follow-up Information Follow up With Details Comments Contact Info Phys Other, MD   Patient can only remember the practice name and not the physician Discharge Instructions POST DELIVERY DISCHARGE INSTRUCTIONS Name: Cherrie Canavan YOB: 1985 Primary Diagnosis: Active Problems: 
  Supervision of normal IUP (intrauterine pregnancy) in multigravida (3/19/2017) Normal labor and delivery (3/20/2017) Postpartum care and examination (3/20/2017) General:  
 
Diet/Diet Restrictions: 
Eight 8-ounce glasses of fluid daily (water, juices); avoid excessive caffeine intake. Meals/snacks as desired which are high in fiber and carbohydrates and low in fat and cholesterol. Avoid heavy lifting, no more than the baby alone (not the baby in the car seat). Avoid intercourse until you are seen at your postpartum visit. No douching or tampon use. Check with obstetrician before starting or resuming an exercise program.    
 
 
Discharge Instructions/Special Treatment/Home Care Needs:  
 
Continue prenatal vitamins. Continue to use squirt bottle with warm water on your perineum after each bathroom use until bleeding stops. Call your doctor for the following:  
 
Fever over 101 degrees by mouth. Vaginal bleeding that soaks two pads per hour for more than one hour. Red streaks or increased swelling of legs, painful red streaks on your breast. 
If you feel extremely anxious or overwhelmed. If you have thoughts of harming yourself and/or your baby. Pain Management: Pain Management:  
Take Acetaminophen (Tylenol) or Ibuprofen (Advil, Motrin), as directed for pain. Use a warm Sitz bath 3 times daily to relieve perineal or hemorrhoidal discomfort. For hemorrhoidal discomfort, use Tucks and Anusol cream as needed and directed. Follow-Up Care:  
 
Appointment with MD: Follow-up Appointments Procedures  FOLLOW UP VISIT Appointment in: 6 Weeks Standing Status:   Standing Number of Occurrences:   1 Order Specific Question:   Appointment in Answer:   6 Weeks Telephone number: [phone] Signed By: Charlene Landaverde CNM Chart Review Routing History No Routing History on File

## 2017-03-19 NOTE — ANESTHESIA PREPROCEDURE EVALUATION
Anesthetic History   No history of anesthetic complications            Review of Systems / Medical History  Patient summary reviewed, nursing notes reviewed and pertinent labs reviewed    Pulmonary  Within defined limits                 Neuro/Psych   Within defined limits           Cardiovascular  Within defined limits                Exercise tolerance: >4 METS     GI/Hepatic/Renal  Within defined limits              Endo/Other  Within defined limits           Other Findings              Physical Exam    Airway  Mallampati: II  TM Distance: 4 - 6 cm  Neck ROM: normal range of motion   Mouth opening: Normal     Cardiovascular               Dental  No notable dental hx       Pulmonary                 Abdominal         Other Findings            Anesthetic Plan    ASA: 2  Anesthesia type: epidural            Anesthetic plan and risks discussed with: Patient      Risks of epidural, including but not limited to bleeding, infection, back pain, headache, seizure, nerve injury, and block failure discussed with patient and accepted.

## 2017-03-19 NOTE — IP AVS SNAPSHOT
[address] [address] 
[phone] Patient: Marlyn Aparicio MRN: [id number] :1985 You are allergic to the following No active allergies Recent Documentation Height Weight Breastfeeding? BMI OB Status Smoking Status 1.549 m 72.6 kg Unknown 30.23 kg/m2 Recent pregnancy Never Smoker Emergency Contacts Name Discharge Info Relation Home Work Mobile [address] [facility] CAREGIVER [3] Spouse [3] [phone] Susana Fothergill  Mother [14] [phone] About your hospitalization You were admitted on:  2017 You last received care in the:  [facility] You were discharged on:  2017 Unit phone number:  [phone] Why you were hospitalized Your primary diagnosis was:  Not on File Your diagnoses also included:  Supervision Of Normal Iup (Intrauterine Pregnancy) In Multigravida, Normal Labor And Delivery, Postpartum Care And Examination Providers Seen During Your Hospitalizations Provider Role Specialty Primary office phone Atul Padron MD Attending Provider Obstetrics & Gynecology [phone] Your Primary Care Physician (PCP) Primary Care Physician Office Phone Office Fax OTHER, PHYS ** None ** ** None ** Follow-up Information Follow up With Details Comments Contact Info Stacey Mathias MD   Patient can only remember the practice name and not the physician Current Discharge Medication List  
  
START taking these medications Dose & Instructions Dispensing Information Comments Morning Noon Evening Bedtime  
 ibuprofen 800 mg tablet Commonly known as:  MOTRIN Your last dose was: Your next dose is:    
   
   
 Dose:  800 mg Take 1 Tab by mouth every eight (8) hours as needed. Quantity:  50 Tab Refills:  0 Where to Get Your Medications Information on where to get these meds will be given to you by the nurse or doctor. ! Ask your nurse or doctor about these medications  
  ibuprofen 800 mg tablet Discharge Instructions POST DELIVERY DISCHARGE INSTRUCTIONS Name: Gentry Lucero YOB: 1985 Primary Diagnosis: Active Problems: 
  Supervision of normal IUP (intrauterine pregnancy) in multigravida (3/19/2017) Normal labor and delivery (3/20/2017) Postpartum care and examination (3/20/2017) General:  
 
Diet/Diet Restrictions: 
Eight 8-ounce glasses of fluid daily (water, juices); avoid excessive caffeine intake. Meals/snacks as desired which are high in fiber and carbohydrates and low in fat and cholesterol. Avoid heavy lifting, no more than the baby alone (not the baby in the car seat). Avoid intercourse until you are seen at your postpartum visit. No douching or tampon use. Check with obstetrician before starting or resuming an exercise program.    
 
 
Discharge Instructions/Special Treatment/Home Care Needs:  
 
Continue prenatal vitamins. Continue to use squirt bottle with warm water on your perineum after each bathroom use until bleeding stops. Call your doctor for the following:  
 
Fever over 101 degrees by mouth. Vaginal bleeding that soaks two pads per hour for more than one hour. Red streaks or increased swelling of legs, painful red streaks on your breast. 
If you feel extremely anxious or overwhelmed. If you have thoughts of harming yourself and/or your baby. Pain Management:  
 
Pain Management:  
Take Acetaminophen (Tylenol) or Ibuprofen (Advil, Motrin), as directed for pain. Use a warm Sitz bath 3 times daily to relieve perineal or hemorrhoidal discomfort. For hemorrhoidal discomfort, use Tucks and Anusol cream as needed and directed. Follow-Up Care:  
 
Appointment with MD: Follow-up Appointments Procedures  FOLLOW UP VISIT Appointment in: 6 Weeks Standing Status:   Standing Number of Occurrences:   1 Order Specific Question:   Appointment in Answer:   6 Weeks Telephone number: [phone] Signed By: Adolph Kim CNM Discharge Instructions Attachments/References POSTPARTUM CARE (ENGLISH) DEPRESSION: POSTPARTUM (ENGLISH) BREASTFEEDING (ENGLISH) FEEDING: : PEDIATRIC (ENGLISH) Discharge Orders None Introducing [facility]! Manjeet Naomie introduces [employer] patient portal. Now you can access parts of your medical record, email your doctor's office, and request medication refills online. 1. In your internet browser, go to [email or web address] 2. Click on the First Time User? Click Here link in the Sign In box. You will see the New Member Sign Up page. 3. Enter your [employer] Access Code exactly as it appears below. You will not need to use this code after youve completed the sign-up process. If you do not sign up before the expiration date, you must request a new code. · [employer] Access Code: [email or web address] Expires: 2017  1:21 AM 
 
4. Enter the last four digits of your Social Security Number (xxxx) and Date of Birth (mm/dd/yyyy) as indicated and click Submit. You will be taken to the next sign-up page. 5. Create a [employer] ID. This will be your [employer] login ID and cannot be changed, so think of one that is secure and easy to remember. 6. Create a [employer] password. You can change your password at any time. 7. Enter your Password Reset Question and Answer. This can be used at a later time if you forget your password. 8. Enter your e-mail address. You will receive e-mail notification when new information is available in [address] 9. Click Sign Up. You can now view and download portions of your medical record. 10. Click the Download Summary menu link to download a portable copy of your medical information. If you have questions, please visit the Frequently Asked Questions section of the [employer] website. Remember, [employer] is NOT to be used for urgent needs. For medical emergencies, dial 911. Now available from your iPhone and Android! General Information Please provide this summary of care documentation to your next provider. Patient Signature:  ____________________________________________________________ Date:  ____________________________________________________________  
  
Bakari Cornelius Provider Signature:  ____________________________________________________________ Date:  ____________________________________________________________ More Information Postpartum: Care Instructions Your Care Instructions After childbirth (postpartum period), your body goes through many changes. Some of these changes happen over several weeks. In the hours after delivery, your body will begin to recover from childbirth while it prepares to breastfeed your . You may feel emotional during this time. Your hormones can shift your mood without warning for no clear reason. In the first couple of weeks after childbirth, many women have emotions that change from happy to sad. You may find it hard to sleep. You may cry a lot. This is called the \"baby blues. \" These overwhelming emotions often go away within a couple of days or weeks. But it's important to discuss your feelings with your doctor. It is easy to get too tired and overwhelmed during the first weeks after childbirth. Don't try to do too much. Get rest whenever you can, accept help from others, and eat well and drink plenty of fluids. About 4 to 6 weeks after your baby's birth, you will have a follow-up visit with your doctor.  This visit is your time to talk to your doctor about anything you are concerned or curious about. Follow-up care is a key part of your treatment and safety. Be sure to make and go to all appointments, and call your doctor if you are having problems. It's also a good idea to know your test results and keep a list of the medicines you take. How can you care for yourself at home? · Sleep or rest when your baby sleeps. · Get help with household chores from family or friends, if you can. Do not try to do it all yourself. · If you have hemorrhoids or swelling or pain around the opening of your vagina, try using cold and heat. You can put ice or a cold pack on the area for 10 to 20 minutes at a time. Put a thin cloth between the ice and your skin. Also try sitting in a few inches of warm water (sitz bath) 3 times a day and after bowel movements. · Take pain medicines exactly as directed. ¨ If the doctor gave you a prescription medicine for pain, take it as prescribed. ¨ If you are not taking a prescription pain medicine, ask your doctor if you can take an over-the-counter medicine. · Eat more fiber to avoid constipation. Include foods such as whole-grain breads and cereals, raw vegetables, raw and dried fruits, and beans. · Drink plenty of fluids, enough so that your urine is light yellow or clear like water. If you have kidney, heart, or liver disease and have to limit fluids, talk with your doctor before you increase the amount of fluids you drink. · Do not rinse inside your vagina with fluids (douche). · If you have stitches, keep the area clean by pouring or spraying warm water over the area outside your vagina and anus after you use the toilet. · Keep a list of questions to bring to your postpartum visit. Your questions might be about: 
¨ Changes in your breasts, such as lumps or soreness. ¨ When to expect your menstrual period to start again. ¨ What form of birth control is best for you. ¨ Weight you have put on during the pregnancy. ¨ Exercise options. ¨ What foods and drinks are best for you, especially if you are breastfeeding. ¨ Problems you might be having with breastfeeding. ¨ When you can have sex. Some women may want to talk about lubricants for the vagina. ¨ Any feelings of sadness or restlessness that you are having. When should you call for help? Call 911 anytime you think you may need emergency care. For example, call if: 
· You have thoughts of harming yourself, your baby, or another person. · You passed out (lost consciousness). Call your doctor now or seek immediate medical care if: 
· Your vaginal bleeding seems to be getting heavier. · You are dizzy or lightheaded, or you feel like you may faint. · You have a fever. Watch closely for changes in your health, and be sure to contact your doctor if: 
· You have new or worse vaginal discharge. · You feel sad or depressed. · You are having problems with your breasts or breastfeeding. Where can you learn more? Go to http://krystal-jenn.info/. Enter H441 in the search box to learn more about \"Postpartum: Care Instructions. \" Current as of: May 30, 2016 Content Version: 11.1 © 3971-5168 [employer]. Care instructions adapted under license by [employer] (which disclaims liability or warranty for this information). If you have questions about a medical condition or this instruction, always ask your healthcare professional. [place] [address] any warranty or liability for your use of this information. Depression After Childbirth: Care Instructions Your Care Instructions Many women get the \"baby blues\" during the first few days after childbirth. You may lose sleep, feel irritable, and cry easily. You may feel happy one minute and sad the next. Hormone changes are one cause of these emotional changes.  Also, the demands of a new baby, along with visits from relatives or other family needs, add to a mother's stress. The \"baby blues\" often peak around the fourth day. Then they ease up in less than 2 weeks. If your moodiness or anxiety lasts for more than 2 weeks, or if you feel like life is not worth living, you may have postpartum depression. This is different for each mother. Some mothers with serious depression may worry intensely about their infant's well-being. Others may feel distant from their child. Some mothers might even feel that they might harm their baby. A mother may have signs of paranoia, wondering if someone is watching her. Depression is not a sign of weakness. It is a medical condition that requires treatment. Medicine and counseling often work well to reduce depression. Talk to your doctor about taking antidepressant medicine while breastfeeding. Follow-up care is a key part of your treatment and safety. Be sure to make and go to all appointments, and call your doctor if you are having problems. It's also a good idea to know your test results and keep a list of the medicines you take. How do you know if you are depressed? With all the changes in your life, you may not know if you are depressed. Pregnancy sometimes causes changes in how you feel that are similar to the symptoms of depression. Symptoms of depression include: · Feeling sad or hopeless and losing interest in daily activities. These are the most common symptoms of depression. · Sleeping too much or not enough. · Feeling tired. You may feel as if you have no energy. · Eating too much or too little. · Writing or talking about death, such as writing suicide notes or talking about guns, knives, or pills. Keep the numbers for these national suicide hotlines: 4-813-489-TALK (5-495-251-134.189.5042) and 7-518-NTYQEPQ (9-264.644.6392). If you or someone you know talks about suicide or feeling hopeless, get help right away. How can you care for yourself at home? · Be safe with medicines. Take your medicines exactly as prescribed. Call your doctor if you think you are having a problem with your medicine. · Eat a healthy diet so that you can keep up your energy. · Get regular daily exercise, such as walks, to help improve your mood. · Get as much sunlight as possible. Keep your shades and curtains open. Get outside as much as you can. · Avoid using alcohol or other substances to feel better. · Get as much rest and sleep as possible. Avoid doing too much. Being too tired can increase depression. · Play stimulating music throughout your day and soothing music at night. · Schedule outings and visits with friends and family. Ask them to call you regularly, so that you do not feel alone. · Ask for help with preparing food and other daily tasks. Family and friends are often happy to help a mother with a . · Be honest with yourself and those who care about you. Tell them about your struggle. · Join a support group of new mothers. No one can better understand the challenges of caring for a  than other new mothers. · If you feel like life is not worth living or are feeling hopeless, get help right away. Keep the numbers for these national suicide hotlines: 0-973-035-TALK (4-722.499.7872) and 3-318-GTCFPIW (4-389.784.6767). When should you call for help? Call 911 anytime you think you may need emergency care. For example, call if: 
· You feel you cannot stop from hurting yourself, your baby, or someone else. Call your doctor now or seek immediate medical care if: 
· You are having trouble caring for yourself or your baby. · You hear voices. Watch closely for changes in your health, and be sure to contact your doctor if: 
· You have problems with your depression medicine. · You do not get better as expected. Where can you learn more? Go to http://krystal-jenn.info/.  
Enter O394 in the search box to learn more about \"Depression After Childbirth: Care Instructions. \" Current as of: July 26, 2016 Content Version: 11.1 © 2424-5812 [employer]. Care instructions adapted under license by [employer] (which disclaims liability or warranty for this information). If you have questions about a medical condition or this instruction, always ask your healthcare professional. [address] any warranty or liability for your use of this information. Breastfeeding: Care Instructions Your Care Instructions Breastfeeding has many benefits. It may lower your baby's chances of getting an infection. It also may prevent your baby from having problems such as diabetes and high cholesterol later in life. Breastfeeding also helps you bond with your baby. The American Academy of Pediatrics recommends breastfeeding for at least a year. That may be very hard for many women to do, but breastfeeding even for a shorter period of time is a health benefit to you and your baby. In the first days after birth, your breasts make a thick, yellow liquid called colostrum. This liquid gives your baby nutrients and antibodies against infection. It is all that babies need in the first days after birth. Your breasts will fill with milk a few days after the birth. Breastfeeding is a skill that gets better with practice. It is common to have some problems. Some women have sore or cracked nipples, blocked milk ducts, or a breast infection (mastitis). But if you feed your baby every 1 to 2 hours during the day and follow the tips on this sheet, you may not have these problems. You can treat these problems if they happen and continue breastfeeding. Follow-up care is a key part of your treatment and safety. Be sure to make and go to all appointments, and call your doctor if you are having problems. It's also a good idea to know your test results and keep a list of the medicines you take. How can you care for yourself at home? · Breastfeed your baby whenever he or she is hungry. In the first 2 weeks, your baby will feed about every 1 to 3 hours. This will help you keep up your supply of milk. · Put a bed pillow or a nursing pillow on your lap to support your arms and your baby. · Hold your baby in a comfortable position. ¨ You can hold your baby in several ways. One of the most common positions is the cradle hold. One arm supports your baby, with his or her head in the bend of your elbow. Your open hand supports your baby's bottom or back. Your baby's belly lies against yours. ¨ If you had your baby by , or , try the football hold. This position keeps your baby off your belly. Tuck your baby under your arm, with his or her body along the side you will be feeding on. Support your baby's upper body with your arm. With that hand you can control your baby's head to bring his or her mouth to your breast. 
¨ Try different positions with each feeding. If you are having problems, ask for help from your doctor or a lactation consultant. · To get your baby to latch on: 
¨ Support and narrow your breast with one hand using a \"U hold,\" with your thumb on the outer side of your breast and your fingers on the inner side. You can also use a \"C hold,\" with all your fingers below the nipple and your thumb above it. Try the different holds to get the deepest latch for whichever breastfeeding position you use. Your other arm is behind your baby's back, with your hand supporting the base of the baby's head. Position your fingers and thumb to point toward your baby's ears. ¨ You can touch your baby's lower lip with your nipple to get your baby to open his or her mouth. Wait until your baby opens up really wide, like a big yawn. Then be sure to bring the baby quickly to your breastnot your breast to the baby. As you bring your baby toward your breast, use your other hand to support the breast and guide it into his or her mouth. ¨ Both the nipple and a large portion of the darker area around the nipple (areola) should be in the baby's mouth. The baby's lips should be flared outward, not folded in (inverted). ¨ Listen for a regular sucking and swallowing pattern while the baby is feeding. If you cannot see or hear a swallowing pattern, watch the baby's ears, which will wiggle slightly when the baby swallows. If the baby's nose appears to be blocked by your breast, tilt the baby's head back slightly, so just the edge of one nostril is clear for breathing. ¨ When your baby is latched, you can usually remove your hand from supporting your breast and bring it under your baby to cradle him or her. Now just relax and breastfeed your baby. · You will know that your baby is feeding well when: 
¨ His or her mouth covers a lot of the areola, and the lips are flared out. ¨ His or her chin and nose rest against your breast. 
¨ Sucking is deep and rhythmic, with short pauses. ¨ You are able to see and hear your baby swallowing. ¨ You do not feel pain in your nipple. · If your baby takes only one breast at a feeding, start the next feeding on the other breast. 
· Anytime you need to remove your baby from the breast, put one finger in the corner of his or her mouth. Push your finger between your baby's gums to gently break the seal. If you do not break the tight seal before you remove your baby, your nipples can become sore, cracked, or bruised. · After feeding your baby, gently pat his or her back to let out any swallowed air. After your baby burps, offer the breast again, or offer the other breast. Sometimes a baby will want to keep feeding after being burped. When should you call for help?  
Call your doctor now or seek immediate medical care if: 
· You have symptoms of a breast infection, such as: 
¨ Increased pain, swelling, redness, or warmth around a breast. 
¨ Red streaks extending from the breast. 
¨ Pus draining from a breast. 
 ¨ A fever. · Your baby has no wet diapers for 6 hours. Watch closely for changes in your health, and be sure to contact your doctor if: 
· Your baby has trouble latching on to your breast. 
· You continue to have pain or discomfort when breastfeeding. · You have other questions or concerns. Where can you learn more? Go to http://krystal-jenn.info/. Enter P492 in the search box to learn more about \"Breastfeeding: Care Instructions. \" Current as of: May 30, 2016 Content Version: 11.1 © 8723-9772 [employer]. Care instructions adapted under license by [employer] (which disclaims liability or warranty for this information). If you have questions about a medical condition or this instruction, always ask your healthcare professional. [address] any warranty or liability for your use of this information. Feeding Your : Care Instructions Your Care Instructions Feeding a  is an important concern for parents. Experts recommend that newborns be fed on demand. This means that you breastfeed or bottle-feed your infant whenever he or she shows signs of hunger, rather than setting a strict schedule. Newborns follow their feelings of hunger. They eat when they are hungry and stop eating when they are full. Most experts also recommend breastfeeding for at least the first year. A common concern for parents is whether their baby is eating enough. Talk to your doctor if you are concerned about how much your baby is eating. Most newborns lose weight in the first several days after birth but regain it within a week or two. After 3weeks of age, your baby should continue to gain weight steadily. Follow-up care is a key part of your child's treatment and safety. Be sure to make and go to all appointments, and call your doctor if your child is having problems.  It's also a good idea to know your child's test results and keep a list of the medicines your child takes. How can you care for your child at home? · Allow your baby to feed on demand. ¨ During the first 2 weeks, these feedings occur every 1 to 3 hours (about 8 to 12 feedings in a 24-hour period) for  babies. These early feedings may last only a few minutes. Over time, feeding sessions will become longer and may happen less often. ¨ Formula-fed babies may have slightly fewer feedings, about 6 to 10 every 24 hours. They will eat about 2 to 3 ounces every 3 to 4 hours during the first few weeks of life. ¨ By 2 months, most babies have a set feeding routine. But your baby's routine may change at times, such as during growth spurts when your baby may be hungry more often. · You may have to wake a sleepy baby to feed in the first few days after birth. · Do not give any milk other than breast milk or infant formula until your baby is 1 year of age. Cow's milk, goat's milk, and soy milk do not have the nutrients that very young babies need to grow and develop properly. Cow and goat milk are very hard for young babies to digest. 
· Ask your doctor about giving a vitamin D supplement starting within the first few days after birth. · If you choose to switch your baby from the breast to bottle-feeding, try these tips. ¨ Try letting your baby drink from a bottle. Slowly reduce the number of times you breastfeed each day. For a week, replace a breastfeeding with a bottle-feeding during one of your daily feeding times. ¨ Each week, choose one more breastfeeding time to replace or shorten. ¨ Offer the bottle before each breastfeeding. When should you call for help? Watch closely for changes in your child's health, and be sure to contact your doctor if: 
· You have questions about feeding your baby. · You are concerned that your baby is not eating enough. · You have trouble feeding your baby. Where can you learn more? Go to http://krystal-jenn.info/. Enter [phone] in the search box to learn more about \"Feeding Your : Care Instructions. \" Current as of: 2016 Content Version: 11.1 © 7441-5131 [employer]. Care instructions adapted under license by [employer] (which disclaims liability or warranty for this information). If you have questions about a medical condition or this instruction, always ask your healthcare professional. [place] [address] any warranty or liability for your use of this information.

## 2017-03-19 NOTE — L&D DELIVERY NOTE
Delivery Note    Obstetrician:  Haylee Blanc CNM    Assistant: none    Pre-Delivery Diagnosis: Term pregnancy, Spontaneous labor or Single fetus    Post-Delivery Diagnosis: Living  infant(s) or Male    Intrapartum Event: None    Procedure: Spontaneous vaginal delivery    Epidural: YES    Monitor:  Fetal Heart Tones - External and Uterine Contractions - External    Indications for instrumental delivery: none    Estimated Blood Loss: 150    Episiotomy: none    Laceration(s):  none    Laceration(s) repair: NO    Presentation: Cephalic    Fetal Description: arrington    Fetal Position: Occiput Anterior    Birth Weight: not yet assessed    Birth Length: not yet assessed    Apgar - One Minute: 9    Apgar - Five Minutes: 9    Umbilical Cord: 3 vessels present    Specimens: DNA sample, placenta delivered intact           Complications:  none           Cord Blood Results:   Information for the patient's :  Cindy LOPEZ [[id number]]   No results found for: PCTABR, PCTDIG, BILI, ABORH    Prenatal Labs:     Lab Results   Component Value Date/Time    HBsAg, External negative 2016    HIV, External negative 2016    Rubella, External immune 2016    RPR, External nonreactive 2016    Gonorrhea, External negative 2016    Chlamydia, External negative 2016    GrBStrep, External negative 2017        Attending Attestation: I was present and scrubbed for the entire procedure    Signed By:  Haylee Blanc CNM     2017

## 2017-03-19 NOTE — PROGRESS NOTES
1700  39.1 weeks patient arrived to unit with complaints of contractions since 1500 every 2-3 minutes, denies leaking of fluids or vaginal bleeding, reports good fetal movements. Patient taken to labor room 2.     1701 M. NORMA Rosales at bedside, SVE 7/100/0. Orders to direct admit patient for active labor. 1735 SVE 8/100/0, requesting epidural at this time. 7930 Petra Sullivan at bedside to discuss epidural procedure  1811 Time out   182 Test dose   182 Catheter placed   182 Loading dose   182 Fentanyl vial handed to Dr. Bita Sweet Connected to pump    1840 CNM at bedside, SVE 9cm with big bulging bag    1843 bolus by Dr. Milan Jaramillo AROM clear fluid, patient complete  185 pushing with contractions  1853 straight catheter by NORMA  190 delivery of viable male infant  190 cord clamped and cut  190 delivery of placenta     191 report given to JAIME Ignacio RN

## 2017-03-19 NOTE — IP AVS SNAPSHOT
Current Discharge Medication List  
  
START taking these medications Dose & Instructions Dispensing Information Comments Morning Noon Evening Bedtime  
 ibuprofen 800 mg tablet Commonly known as:  MOTRIN Your last dose was: Your next dose is:    
   
   
 Dose:  800 mg Take 1 Tab by mouth every eight (8) hours as needed. Quantity:  50 Tab Refills:  0 Where to Get Your Medications Information on where to get these meds will be given to you by the nurse or doctor. ! Ask your nurse or doctor about these medications  
  ibuprofen 800 mg tablet

## 2017-03-19 NOTE — ANESTHESIA PROCEDURE NOTES
Epidural Block    Start time: 3/19/2017 6:06 PM  Performed by: Vivian Sánchez  Authorized by: Vivian Sánchez     Pre-Procedure  Indication: labor epidural    Preanesthetic Checklist: patient identified, risks and benefits discussed, anesthesia consent, site marked, patient being monitored, timeout performed and anesthesia consent    Timeout Time: 18:11        Epidural:   Patient position:  Seated  Prep region:  Lumbar  Prep: Chlorhexidine    Location:  L3-4    Needle and Epidural Catheter:   Needle Type:  Tuohy  Needle Gauge:  17 G  Injection Technique:  Loss of resistance using saline  Attempts:  3  Catheter Size:  20 G  Catheter at Skin Depth (cm):  11  Depth in Epidural Space (cm):  5  Events: no blood with aspiration, no cerebrospinal fluid with aspiration, no paresthesia and negative aspiration test    Test Dose:  Negative    Assessment:   Catheter Secured:  Tegaderm and tape  Insertion:  Uncomplicated  Patient tolerance:  Patient tolerated the procedure well with no immediate complications  OSx2; pt repositioned, + AGNES w/ PFNS @ 6cm

## 2017-03-19 NOTE — PROGRESS NOTES
1910 Bedside and Verbal shift change report given to JAIME Ignacio RN (oncoming nurse) by Beverly Richardson RN (offgoing nurse). Report included the following information SBAR and Kardex, I&O, delivery summary, and MAR reviewed. 1920 Assessment completed as documented per flow sheet. Pt resting in bed nursing baby. No c/o @ this time. 2047 Pt medicated with motrin per request for c/o cramping. 2145 Pt reports good relief from motrin. 2205 Pt transferred to room 253 via w/c. Oriented to room and staff. Bedside and Verbal shift change report given to JAIME Sandy RN (oncoming nurse) by Luther Epps RN (offgoing nurse). Report included the following information SBAR and Kardex, I&O, delivery summary, and MAR reviewed.

## 2017-03-19 NOTE — H&P
History & Physical    Name: Luna Hummel MRN: [id number]  SSN: [id number]    YOB: 1985  Age: 32 y.o. Sex: female        Subjective:     Estimated Date of Delivery: 3/25/17  OB History    Para Term  AB SAB TAB Ectopic Multiple Living   3 2 2       2      # Outcome Date GA Lbr Dillan/2nd Weight Sex Delivery Anes PTL Lv   3 Current            2 Term     F   N Y   1 Term     M   N Y          Ms. Heron Vitale is admitted with pregnancy at 39w1d for active labor. Prenatal course was normal. Please see prenatal records for details. No past medical history on file. Past Surgical History:   Procedure Laterality Date    BREAST SURGERY PROCEDURE UNLISTED       Social History     Occupational History    Not on file. Social History Main Topics    Smoking status: Never Smoker    Smokeless tobacco: Not on file    Alcohol use No    Drug use: No    Sexual activity: Not on file     No family history on file. No Known Allergies  Prior to Admission medications    Not on File        Review of Systems: A comprehensive review of systems was negative except for that written in the HPI. Objective:     Vitals: There were no vitals filed for this visit. Physical Exam:  Cervical Exam: 8 cm dilated    100% effaced    0 station    Presenting Part: cephalic  Membranes:  Intact  Fetal Heart Rate: Reactive  Cat 1 tracing      Prenatal Labs:   No results found for: ABORH, RUBELLAEXT, GRBSEXT, HBSAGEXT, HIVEXT, RPREXT, GONNOEXT, CHLAMEXT, ABORHEXT, RUBELLAEXT, GRBSEXT, HBSAGEXT, HIVEXT, RPREXT, GONNOEXT, CHLAMEXT      Assessment/Plan:     Active Problems:    * No active hospital problems. *       Plan: Admit for Reassuring fetal status, Labor  Progressing normally, Continue plan for vaginal delivery. Group B Strep was negative.     Signed By:  Moose Bueno CNM     2017

## 2017-03-20 LAB
HCT VFR BLD AUTO: 32.1 % (ref 35–45)
HGB BLD-MCNC: 10.6 G/DL (ref 12–16)

## 2017-03-20 PROCEDURE — 85018 HEMOGLOBIN: CPT | Performed by: OBSTETRICS & GYNECOLOGY

## 2017-03-20 PROCEDURE — 85014 HEMATOCRIT: CPT | Performed by: OBSTETRICS & GYNECOLOGY

## 2017-03-20 PROCEDURE — 65270000029 HC RM PRIVATE

## 2017-03-20 PROCEDURE — 74011250637 HC RX REV CODE- 250/637: Performed by: ADVANCED PRACTICE MIDWIFE

## 2017-03-20 PROCEDURE — 36415 COLL VENOUS BLD VENIPUNCTURE: CPT | Performed by: OBSTETRICS & GYNECOLOGY

## 2017-03-20 RX ORDER — IBUPROFEN 800 MG/1
800 TABLET ORAL
Qty: 50 TAB | Refills: 0 | Status: SHIPPED | OUTPATIENT
Start: 2017-03-20

## 2017-03-20 RX ADMIN — OXYCODONE HYDROCHLORIDE AND ACETAMINOPHEN 2 TABLET: 5; 325 TABLET ORAL at 00:16

## 2017-03-20 RX ADMIN — IBUPROFEN 800 MG: 400 TABLET, FILM COATED ORAL at 15:28

## 2017-03-20 RX ADMIN — OXYCODONE HYDROCHLORIDE AND ACETAMINOPHEN 2 TABLET: 5; 325 TABLET ORAL at 19:11

## 2017-03-20 RX ADMIN — IBUPROFEN 800 MG: 400 TABLET, FILM COATED ORAL at 06:44

## 2017-03-20 NOTE — PROGRESS NOTES
Bedside and Verbal shift change report given to ARCENIO Bermeo RN  by Colonel Donnell RN . Report given with Litzy EMERY and MAR.

## 2017-03-20 NOTE — LACTATION NOTE
Breastfeeding basics and log sheet discussed. Set up with pump and instructed on use per protocol for hx of breast surgery. Per mom infant latching and nursing well. Will page for feeds.

## 2017-03-20 NOTE — PROGRESS NOTES
Bedside and Verbal shift change report given to JAIME Lai RN (oncoming nurse) by ARCENIO You RN (offgoing nurse). Report included the following information SBAR, Kardex and Recent Results.

## 2017-03-20 NOTE — PROGRESS NOTES
Bedside shift change report given to Aquilino Weston RN (oncoming nurse) by Daniel Ordonez RN (offgoing nurse). Report included the following information SBAR, Procedure Summary, Intake/Output and MAR.

## 2017-03-20 NOTE — PROGRESS NOTES
Progress Note    Patient: Maranda Lewis MRN: [id number]     YOB: 1985  Age: 32 y.o. Subjective:     Postpartum Day: 1    The patient is feeling well. Pain is  well controlled with current medications. Baby is feeding via breast without difficulty. Urinary output is adequate. Objective:      No data found. General:    alert, cooperative, no distress   Lochia:  appropriate   Uterine Fundus:   firm @ umbilicus    Perineum:  Intact    DVT Evaluation:  No evidence of DVT seen on physical exam.     Lab/Data Review:  Recent Results (from the past 24 hour(s))   CBC WITH AUTOMATED DIFF    Collection Time: 17  5:15 PM   Result Value Ref Range    WBC 12.7 4.6 - 13.2 K/uL    RBC 3.90 (L) 4.20 - 5.30 M/uL    HGB 11.3 (L) 12.0 - 16.0 g/dL    HCT 34.5 (L) 35.0 - 45.0 %    MCV 88.5 74.0 - 97.0 FL    MCH 29.0 24.0 - 34.0 PG    MCHC 32.8 31.0 - 37.0 g/dL    RDW 14.4 11.6 - 14.5 %    PLATELET 596 298 - 891 K/uL    MPV 11.7 9.2 - 11.8 FL    NEUTROPHILS 80 (H) 40 - 73 %    LYMPHOCYTES 13 (L) 21 - 52 %    MONOCYTES 6 3 - 10 %    EOSINOPHILS 1 0 - 5 %    BASOPHILS 0 0 - 2 %    ABS. NEUTROPHILS 10.2 (H) 1.8 - 8.0 K/UL    ABS. LYMPHOCYTES 1.6 0.9 - 3.6 K/UL    ABS. MONOCYTES 0.8 0.05 - 1.2 K/UL    ABS. EOSINOPHILS 0.1 0.0 - 0.4 K/UL    ABS. BASOPHILS 0.0 0.0 - 0.06 K/UL    DF AUTOMATED     TYPE & SCREEN    Collection Time: 17  5:15 PM   Result Value Ref Range    Crossmatch Expiration 2017     ABO/Rh(D) A POSITIVE     Antibody screen NEG    HEMATOCRIT    Collection Time: 17  3:08 AM   Result Value Ref Range    HCT 32.1 (L) 35.0 - 45.0 %   HEMOGLOBIN    Collection Time: 17  3:08 AM   Result Value Ref Range    HGB 10.6 (L) 12.0 - 16.0 g/dL     All lab results for the last 24 hours reviewed. Assessment:     Delivery:     Plan:     Doing well postpartum vaginal delivery. Continue current postpartum care. Encouraged hydration, nutrition and ambulation. DC home tomorrow.  Follow-up in office in 6 weeks. Call prn. There are no discharge medications for this patient.       Signed By: Ian Cano CNM     March 20, 2017

## 2017-03-20 NOTE — ANESTHESIA POSTPROCEDURE EVALUATION
3/20/2017  10:58 AM    Laboring Epidural Follow-up Note       Referring physician: Uche Salazar MD   Patient status post vaginal delivery with labor epidural.      Visit Vitals    /81 (BP 1 Location: Right arm, BP Patient Position: At rest)    Pulse 78    Temp 37 °C (98.6 °F)    Resp 18    Ht 5' 1\" (1.549 m)    Wt 72.6 kg (160 lb)    SpO2 98%    Breastfeeding Unknown    BMI 30.23 kg/m2       Patient ambulating and voiding without difficulty. Patient denies headache. Patient denies backache.     [facility], CRNA

## 2017-03-20 NOTE — PROGRESS NOTES
2100: patient assisted OOB to void. Able to walk and void without difficulty.  Pads and gown changed

## 2017-03-20 NOTE — PROGRESS NOTES
Patient was visited by [facility] volunteer Bri Kerr. Volunteer conducted a Spiritual Care Screening and reported no needs to this . Baby [place] Card and Spiritual Care literature were provided. Chaplains will continue to follow and will provide pastoral care as needed or requested. [address], M.Div.   Board Certified   [phone] - Office

## 2017-03-21 VITALS
TEMPERATURE: 97.8 F | HEART RATE: 74 BPM | WEIGHT: 160 LBS | BODY MASS INDEX: 30.21 KG/M2 | HEIGHT: 61 IN | SYSTOLIC BLOOD PRESSURE: 130 MMHG | DIASTOLIC BLOOD PRESSURE: 87 MMHG | RESPIRATION RATE: 17 BRPM | OXYGEN SATURATION: 98 %

## 2017-03-21 PROCEDURE — 74011250637 HC RX REV CODE- 250/637: Performed by: ADVANCED PRACTICE MIDWIFE

## 2017-03-21 RX ADMIN — ACETAMINOPHEN 650 MG: 325 TABLET, FILM COATED ORAL at 08:15

## 2017-03-21 RX ADMIN — IBUPROFEN 800 MG: 400 TABLET, FILM COATED ORAL at 04:16

## 2017-03-21 NOTE — PROGRESS NOTES
Bedside shift change report given to Alan Lee RN (oncoming nurse) by Stan Lee Patient RN (offgoing nurse).  Report included the following information SBAR, Procedure Summary, Intake/Output, MAR and Recent Results.